# Patient Record
Sex: MALE | Race: WHITE | NOT HISPANIC OR LATINO | URBAN - NONMETROPOLITAN AREA
[De-identification: names, ages, dates, MRNs, and addresses within clinical notes are randomized per-mention and may not be internally consistent; named-entity substitution may affect disease eponyms.]

---

## 2017-09-05 NOTE — PATIENT DISCUSSION
Only treatment is surgery and only needs to be done if very bothersome to patient. Patient educated would also make cataracts worse. Laser not recommended due to not accurate.

## 2017-09-05 NOTE — PATIENT DISCUSSION
Patient educated pain and twitch to lids is nerve irritation and not related to floaters. Could be due to stress.

## 2017-11-01 PROBLEM — H52.4: Noted: 2017-11-01

## 2017-11-01 PROBLEM — H25.13: Noted: 2019-01-11

## 2017-11-01 PROBLEM — E11.9: Noted: 2019-01-11

## 2019-01-04 ENCOUNTER — IMPORTED ENCOUNTER (OUTPATIENT)
Dept: URBAN - NONMETROPOLITAN AREA CLINIC 1 | Facility: CLINIC | Age: 64
End: 2019-01-04

## 2019-01-04 PROCEDURE — 99214 OFFICE O/P EST MOD 30 MIN: CPT

## 2019-01-04 NOTE — PATIENT DISCUSSION
*Cataract(s)-Visually significant.-Cataract(s) causing symptomatic impairment of visual function not correctable with a tolerable change in glasses or contact lenses lighting or non-operative means resulting in specific activity limitations and/or participation restrictions including but not limited to reading viewing television driving or meeting vocational or recreational needs. -Expectation is clearer vision and reduced glare disability after cataract removal.-RTC n/a Cat Eval w/ JSHERMES cervantes DR-Stressed the importance of keeping blood sugars under control blood pressure under control and weight normalization and regular visits with PCP. -Explained the possible effects of poorly controlled diabetes and the damage that diabetes can cause to ocular health. -Patient to check HgbA1C.-Pt instructed to contact our office with any vision changes.

## 2019-02-08 ENCOUNTER — IMPORTED ENCOUNTER (OUTPATIENT)
Dept: URBAN - NONMETROPOLITAN AREA CLINIC 1 | Facility: CLINIC | Age: 64
End: 2019-02-08

## 2019-02-08 PROBLEM — H35.373: Noted: 2019-02-08

## 2019-02-08 PROBLEM — H25.813: Noted: 2019-02-08

## 2019-02-08 PROBLEM — E11.9: Noted: 2019-02-08

## 2019-02-08 PROBLEM — H52.4: Noted: 2019-02-08

## 2019-02-08 PROCEDURE — 92134 CPTRZ OPH DX IMG PST SGM RTA: CPT

## 2019-02-08 PROCEDURE — 99214 OFFICE O/P EST MOD 30 MIN: CPT

## 2019-02-08 NOTE — PATIENT DISCUSSION
Cataract(s)-Visually significant cataract OU.-Cataract(s) causing symptomatic impairment of visual function not correctable with a tolerable change in glasses or contact lenses lighting or non-operative means resulting in specific activity limitations and/or participation restrictions including but not limited to reading viewing television driving or meeting vocational or recreational needs. -Expectation is clearer vision and functional improvement in symptoms as well as reduced glare disability after cataract removal.-Order IOLMaster and OPD today. -Recommend Toric IOL OD/Limbal Relaxing Incisions OS based on today's OPD testing and lifestyle questionnaire.-Discussed LenSx and RBAs of laser cataract surgery.-All questions were answered regarding surgery including pre and post-op medications appointments activity restrictions and anesthetic usage.-The risks benefits and alternatives and special risk factors for the patient were discussed in detail including but not limited to: bleeding infection retinal detachment vitreous loss problems with the implant and possible need for additional surgery.-Although rare the possibility of complete vision loss was discussed.-The possible need for glasses post-operatively was discussed.-Order H&P.-Patient elects to proceed with cataract surgery OS. Will schedule at patient's convenience and re-evaluate OD in the future. *Start ATs and Lotemax TID OU x 7 days repeat topos. *ERM OU-Discussed diagnosis with patient.-Discussed findings of exam in detail with the patient.-Discussed the signs of worsening of the disease.-Order OCT MAC today.-Continue to monitor for now. DM s -Stressed the importance of keeping blood sugars under control blood pressure under control and weight normalization and regular visits with PCP. -Explained the possible effects of poorly controlled diabetes and the damage that diabetes can cause to ocular health. -Patient to check HgbA1C.-Pt instructed to contact our office with any vision changes.; Dr's Notes: OCT MAC 2/8/19DFE 2/8/19

## 2019-03-05 PROBLEM — E11.9: Noted: 2019-03-05

## 2019-03-05 PROBLEM — H25.813: Noted: 2019-03-05

## 2019-03-05 PROBLEM — H35.373: Noted: 2019-03-05

## 2019-03-05 PROBLEM — H52.4: Noted: 2019-03-05

## 2019-03-13 ENCOUNTER — IMPORTED ENCOUNTER (OUTPATIENT)
Dept: URBAN - NONMETROPOLITAN AREA CLINIC 1 | Facility: CLINIC | Age: 64
End: 2019-03-13

## 2019-03-13 PROBLEM — E11.9: Noted: 2019-03-13

## 2019-03-13 PROBLEM — I10: Noted: 2019-03-13

## 2019-03-13 PROBLEM — Z01.818: Noted: 2019-03-13

## 2019-03-13 PROBLEM — H25.813: Noted: 2019-03-13

## 2019-03-13 PROCEDURE — 99214 OFFICE O/P EST MOD 30 MIN: CPT

## 2019-03-13 NOTE — PATIENT DISCUSSION
H&P ExamNo outstanding concernsPatient cleared for surgery.; 's Notes: OCT MAC 2/8/19<br />DFE 2/8/19<br />

## 2019-03-29 ENCOUNTER — IMPORTED ENCOUNTER (OUTPATIENT)
Dept: URBAN - NONMETROPOLITAN AREA CLINIC 1 | Facility: CLINIC | Age: 64
End: 2019-03-29

## 2019-03-29 PROBLEM — H25.813: Noted: 2019-03-13

## 2019-03-29 PROBLEM — E11.9: Noted: 2019-03-13

## 2019-03-29 PROBLEM — Z98.42: Noted: 2019-03-29

## 2019-03-29 PROBLEM — I10: Noted: 2019-03-13

## 2019-03-29 NOTE — PATIENT DISCUSSION
s/p PCIOL-Pt doing well s/p PCIOL. -Continue post-op gtts according to instruction sheet and sleep with eye shield over eye for 7 nights.-Avoid bending at the waist lifting anything over 5lbs and dirty or yuki environments. -RTC .; 's Notes: OCT MAC 2/8/19DFE 2/8/19

## 2019-04-03 ENCOUNTER — IMPORTED ENCOUNTER (OUTPATIENT)
Dept: URBAN - NONMETROPOLITAN AREA CLINIC 1 | Facility: CLINIC | Age: 64
End: 2019-04-03

## 2019-04-03 PROBLEM — Z98.41: Noted: 2019-04-12

## 2019-04-03 PROBLEM — I10: Noted: 2019-04-03

## 2019-04-03 PROBLEM — E11.9: Noted: 2019-04-03

## 2019-04-03 PROBLEM — Z98.42: Noted: 2019-04-12

## 2019-04-03 PROBLEM — Z98.42: Noted: 2019-04-03

## 2019-04-03 PROBLEM — H25.811: Noted: 2019-04-03

## 2019-04-03 PROCEDURE — 99024 POSTOP FOLLOW-UP VISIT: CPT

## 2019-04-03 PROCEDURE — 92014 COMPRE OPH EXAM EST PT 1/>: CPT

## 2019-04-03 NOTE — PATIENT DISCUSSION
Cataract(s)-Visually significant cataract OD. -Cataract(s) causing symptomatic impairment of visual function not correctable with a tolerable change in glasses or contact lenses lighting or non-operative means resulting in specific activity limitations and/or participation restrictions including but not limited to reading viewing television driving or meeting vocational or recreational needs. -Expectation is clearer vision and functional improvement in symptoms as well as reduced glare disability after cataract removal.-Recommend Toric IOL   /   Limbal Relaxing Incisions based on previous OPD testing and lifestyle questionnaire.-All questions were answered regarding surgery including pre and post-op medications appointments activity restrictions and anesthetic usage.-The risks benefits and alternatives and special risk factors for the patient were discussed in detail including but not limited to: bleeding infection retinal detachment vitreous loss problems with the implant and possible need for additional surgery.-Although rare the possibility of complete vision loss was discussed.-The need for glasses post-operatively was discussed.-Patient elects to proceed with cataract surgery OD . Will schedule at patient's convenience. s/p PCIOL OS LRI/Lensx 3/28/19-Pt doing well at 1 week s/p PCIOL. -Continue post-op gtts according to instruction sheet.-Okay to resume usual activites and d/c eye shield.; 's Notes: OCT MAC 2/8/19DFE 2/8/19

## 2019-04-12 ENCOUNTER — IMPORTED ENCOUNTER (OUTPATIENT)
Dept: URBAN - NONMETROPOLITAN AREA CLINIC 1 | Facility: CLINIC | Age: 64
End: 2019-04-12

## 2019-04-12 NOTE — PATIENT DISCUSSION
s/p PCIOL-Pt doing well s/p PCIOL. -Continue post-op gtts according to instruction sheet and sleep with eye shield over eye for 7 nights.-Avoid bending at the waist lifting anything over 5lbs and dirty or yuki environments. -RTC 1 week POV Dr. Zeenat Hathaway.; 's Notes: [place] [facility] [place] 2/8/19

## 2019-04-19 ENCOUNTER — IMPORTED ENCOUNTER (OUTPATIENT)
Dept: URBAN - NONMETROPOLITAN AREA CLINIC 1 | Facility: CLINIC | Age: 64
End: 2019-04-19

## 2019-04-19 NOTE — PATIENT DISCUSSION
s/p PCIOL-Pt doing well at 1 week s/p PCIOL. -Continue post-op gtts according to instruction sheet.-Okay to resume usual activites and d/c eye shield.; 's Notes: OCT MAC 2/8/19DFE 2/8/19

## 2019-05-03 ENCOUNTER — IMPORTED ENCOUNTER (OUTPATIENT)
Dept: URBAN - NONMETROPOLITAN AREA CLINIC 1 | Facility: CLINIC | Age: 64
End: 2019-05-03

## 2019-09-05 ENCOUNTER — IMPORTED ENCOUNTER (OUTPATIENT)
Dept: URBAN - NONMETROPOLITAN AREA CLINIC 1 | Facility: CLINIC | Age: 64
End: 2019-09-05

## 2019-09-05 PROBLEM — E11.9: Noted: 2019-09-05

## 2019-09-05 PROBLEM — Z96.1: Noted: 2019-09-05

## 2019-09-05 PROCEDURE — 99214 OFFICE O/P EST MOD 30 MIN: CPT

## 2019-09-05 NOTE — PATIENT DISCUSSION
pseudophakia Enmanuel cervantes DR-Stressed the importance of keeping blood sugars under control blood pressure under control and weight normalization and regular visits with PCP. -Explained the possible effects of poorly controlled diabetes and the damage that diabetes can cause to ocular health. -Patient to check HgbA1C.-Pt instructed to contact our office with any vision changes.; Dr's Notes: OCT MAC 2/8/19DFE 2/8/19

## 2020-09-08 ENCOUNTER — IMPORTED ENCOUNTER (OUTPATIENT)
Dept: URBAN - NONMETROPOLITAN AREA CLINIC 1 | Facility: CLINIC | Age: 65
End: 2020-09-08

## 2020-09-08 PROCEDURE — 99214 OFFICE O/P EST MOD 30 MIN: CPT

## 2020-09-08 NOTE — PATIENT DISCUSSION
pseudophakia oumonitor for pco DM s -Stressed the importance of keeping blood sugars under control blood pressure under control and weight normalization and regular visits with PCP. -Explained the possible effects of poorly controlled diabetes and the damage that diabetes can cause to ocular health. -Patient to check HgbA1C.-Pt instructed to contact our office with any vision changes.; Dr's Notes: OCT MAC 2/8/19DFE 2/8/19

## 2022-04-09 ASSESSMENT — VISUAL ACUITY
OD_PAM: 20/25
OS_GLARE: 20/70
OD_CC: 20/30
OS_CC: 20/40-1
OU_SC: 20/30
OD_SC: 20/25
OD_GLARE: 20/70
OD_CC: 20/40-1
OS_SC: 20/30
OD_SC: 20/25+2
OD_SC: 20/40
OD_GLARE: 20/70
OS_SC: 20/30
OS_CC: 20/40-
OS_AM: 20/25
OS_CC: 20/40
OD_CC: J2
OS_AM: 20/25
OD_CC: 20/30
OD_GLARE: 20/70
OU_CC: J1
OS_CC: J2
OS_SC: 20/30
OS_SC: 20/20
OS_SC: 20/30
OD_SC: 20/40-1
OU_CC: 20/30
OD_PH: 20/30
OD_PAM: 20/25
OD_CC: 20/30
OS_CC: 20/40
OS_GLARE: 20/70
OS_PH: 20/40
OD_PAM: 20/25
OS_CC: 20/30

## 2022-04-09 ASSESSMENT — TONOMETRY
OD_IOP_MMHG: 20
OS_IOP_MMHG: 14
OS_IOP_MMHG: 15
OS_IOP_MMHG: 13
OD_IOP_MMHG: 13
OD_IOP_MMHG: 16
OS_IOP_MMHG: 16
OD_IOP_MMHG: 14
OS_IOP_MMHG: 14
OD_IOP_MMHG: 15
OD_IOP_MMHG: 14
OS_IOP_MMHG: 14
OD_IOP_MMHG: 14
OS_IOP_MMHG: 16